# Patient Record
Sex: FEMALE | Race: WHITE
[De-identification: names, ages, dates, MRNs, and addresses within clinical notes are randomized per-mention and may not be internally consistent; named-entity substitution may affect disease eponyms.]

---

## 2017-04-27 ENCOUNTER — HOSPITAL ENCOUNTER (OUTPATIENT)
Dept: HOSPITAL 10 - U/S | Age: 72
Discharge: HOME | End: 2017-04-27
Attending: STUDENT IN AN ORGANIZED HEALTH CARE EDUCATION/TRAINING PROGRAM
Payer: MEDICARE

## 2017-04-27 DIAGNOSIS — K82.4: Primary | ICD-10-CM

## 2017-04-27 PROCEDURE — 76705 ECHO EXAM OF ABDOMEN: CPT

## 2017-04-27 NOTE — RADRPT
PROCEDURE:   Right upper quadrant abdominal ultrasound. 

 

CLINICAL INDICATION:  Mild pain, gallbladder polyp

 

TECHNIQUE:   Gray scale and color doppler ultrasound images of the right upper quadrant. 

 

COMPARISON:   None 

 

FINDINGS:

 

Pancreas:  Visualized portions appear of normal echogenicity, no focal lesions.

 

Liver: 

Morphology:  Normal in size and contour.

Echogenicity:  Normal.

Focal lesions:   A few benign-appearing cysts are present measuring up to 4.7 cm.

Main portal vein: Patent with hepatopetal flow.

 

Biliary System: 

Normal appearing gallbladder wall.

No gallstones seen. 4 x 10 mm nonshadowing echogenic mural structures noted within the gallbladder.

No intrahepatic biliary dilatation.

Common bile duct measures 2.7 mm in maximal dimension.   

 

Kidneys:

Right 10.0 cm in length. Right renal cortical thickness is preserved.

Normal echogenicity.

No hydronephrosis.

No renal calculi.

No focal lesions.

 

No free fluid identified. 

 

 

IMPRESSION:

4 x 10 mm nonshadowing echogenic mural structure within the gallbladder compatible with the history 
of gallbladder polyp.  The patient's prior examinations are not available for comparison at time of 
interpretation. If this lesion is enlarged from the prior examinations MRI of the abdomen with and w
ithout contrast is suggested for further evaluation.

 

 

 

RPTAT: AADD

_____________________________________________ 

.Antonio Armenta MD, MD           Date    Time 

Electronically viewed and signed by .Antonio Armenta MD, MD on 04/27/2017 09:18 

 

D:  04/27/2017 09:18  T:  04/27/2017 09:18

.B/

## 2017-06-16 ENCOUNTER — HOSPITAL ENCOUNTER (EMERGENCY)
Dept: HOSPITAL 10 - E/R | Age: 72
Discharge: HOME | End: 2017-06-16
Payer: MEDICARE

## 2017-06-16 VITALS
WEIGHT: 89.29 LBS | HEIGHT: 61 IN | BODY MASS INDEX: 16.86 KG/M2 | HEIGHT: 61 IN | BODY MASS INDEX: 16.86 KG/M2 | WEIGHT: 89.29 LBS

## 2017-06-16 VITALS — SYSTOLIC BLOOD PRESSURE: 135 MMHG | HEART RATE: 56 BPM | RESPIRATION RATE: 18 BRPM | DIASTOLIC BLOOD PRESSURE: 91 MMHG

## 2017-06-16 DIAGNOSIS — K21.9: ICD-10-CM

## 2017-06-16 DIAGNOSIS — R10.84: Primary | ICD-10-CM

## 2017-06-16 DIAGNOSIS — R11.2: ICD-10-CM

## 2017-06-16 LAB
ADD SCAN DIFF: NO
ADD UMIC: YES
ALBUMIN SERPL-MCNC: 5.2 G/DL (ref 3.3–4.9)
ALBUMIN/GLOB SERPL: 4 {RATIO}
ALP SERPL-CCNC: 122 IU/L (ref 42–121)
ALT SERPL-CCNC: 27 IU/L (ref 13–69)
ANION GAP SERPL CALC-SCNC: 15 MMOL/L (ref 8–16)
AST SERPL-CCNC: 31 IU/L (ref 15–46)
BASOPHILS # BLD AUTO: 0 10^3/UL (ref 0–0.1)
BASOPHILS NFR BLD: 0.5 % (ref 0–2)
BILIRUB DIRECT SERPL-MCNC: 0 MG/DL (ref 0–0.2)
BILIRUB SERPL-MCNC: 1 MG/DL (ref 0.2–1.3)
BUN SERPL-MCNC: 13 MG/DL (ref 7–20)
CALCIUM SERPL-MCNC: 9.9 MG/DL (ref 8.4–10.2)
CHLORIDE SERPL-SCNC: 101 MMOL/L (ref 97–110)
CO2 SERPL-SCNC: 26 MMOL/L (ref 21–31)
COLOR UR: (no result)
CREAT SERPL-MCNC: 0.63 MG/DL (ref 0.44–1)
EOSINOPHIL # BLD: 0 10^3/UL (ref 0–0.5)
EOSINOPHIL NFR BLD: 0.5 % (ref 0–7)
ERYTHROCYTE [DISTWIDTH] IN BLOOD BY AUTOMATED COUNT: 11.7 % (ref 11.5–14.5)
GLOBULIN SER-MCNC: 1.3 G/DL (ref 1.3–3.2)
GLUCOSE SERPL-MCNC: 106 MG/DL (ref 70–220)
GLUCOSE UR STRIP-MCNC: NEGATIVE %
HCT VFR BLD CALC: 42.5 % (ref 37–47)
HGB BLD-MCNC: 14.3 G/DL (ref 12–16)
KETONES UR STRIP.AUTO-MCNC: NEGATIVE MG/DL
LYMPHOCYTES # BLD AUTO: 1.5 10^3/UL (ref 0.8–2.9)
LYMPHOCYTES NFR BLD AUTO: 34.8 % (ref 15–51)
MCH RBC QN AUTO: 31.8 PG (ref 29–33)
MCHC RBC AUTO-ENTMCNC: 33.6 G/DL (ref 32–37)
MCV RBC AUTO: 94.7 FL (ref 82–101)
MONOCYTES # BLD: 0.2 10^3/UL (ref 0.3–0.9)
MONOCYTES NFR BLD: 5.5 % (ref 0–11)
NEUTROPHILS # BLD: 2.6 10^3/UL (ref 1.6–7.5)
NEUTROPHILS NFR BLD AUTO: 58.5 % (ref 39–77)
NITRITE UR QL STRIP.AUTO: NEGATIVE
NRBC # BLD MANUAL: 0 10^3/UL (ref 0–0)
NRBC BLD QL: 0 /100WBC (ref 0–0)
PLATELET # BLD: 211 10^3/UL (ref 140–415)
PMV BLD AUTO: 10.3 FL (ref 7.4–10.4)
POTASSIUM SERPL-SCNC: 3.7 MMOL/L (ref 3.5–5.1)
PROT SERPL-MCNC: 6.5 G/DL (ref 6.1–8.1)
RBC # BLD AUTO: 4.49 10^6/UL (ref 4.2–5.4)
RBC # UR AUTO: (no result) /UL
RBC #/AREA URNS HPF: (no result) /HPF
SODIUM SERPL-SCNC: 138 MMOL/L (ref 135–144)
UR BILIRUBIN (DIP): NEGATIVE
UR CLARITY: CLEAR
UR TOTAL PROTEIN (DIP): NEGATIVE
UROBILINOGEN UR STRIP-ACNC: (no result) (ref 0.1–1)
WBC # BLD AUTO: 4.4 10^3/UL (ref 4.8–10.8)
WBC # UR STRIP: NEGATIVE /UL

## 2017-06-16 PROCEDURE — 36415 COLL VENOUS BLD VENIPUNCTURE: CPT

## 2017-06-16 PROCEDURE — 96374 THER/PROPH/DIAG INJ IV PUSH: CPT

## 2017-06-16 PROCEDURE — 81001 URINALYSIS AUTO W/SCOPE: CPT

## 2017-06-16 PROCEDURE — 80053 COMPREHEN METABOLIC PANEL: CPT

## 2017-06-16 PROCEDURE — 85025 COMPLETE CBC W/AUTO DIFF WBC: CPT

## 2017-06-16 PROCEDURE — 83690 ASSAY OF LIPASE: CPT

## 2017-06-16 PROCEDURE — 99285 EMERGENCY DEPT VISIT HI MDM: CPT

## 2017-06-16 PROCEDURE — 74176 CT ABD & PELVIS W/O CONTRAST: CPT

## 2017-06-16 PROCEDURE — 96375 TX/PRO/DX INJ NEW DRUG ADDON: CPT

## 2017-06-16 NOTE — ERD
ER Documentation


Chief Complaint


Date/Time


DATE: 6/16/17 


TIME: 14:44


Chief Complaint


abdominal pain and headache since last night





HPI


This is a 71-year-old female who presents to the emergency room for abdominal 

cramping, generalized weakness and slight headache for the past 24 hours.  The 

patient denies any fevers associated with this.  She denies any diarrhea but 

does say she is nauseous and vomited once.  She denies any blood in her vomit 

and denies any bilious color to her vomit.  She does state that she has a 

history of reflux disease and this feels similar to reflux.  She came to the ER 

today for evaluation.





ROS


All systems reviewed and are negative except as per history of present illness.





Medications


Home Meds


Reported Medications


Amitriptyline Hcl* (Amitriptyline Hcl*) 25 Mg Tablet, 25 MG PO QHS, #30 TAB


   6/16/17


Nitroglycerin* (Nitrostat*) 0.4 Mg Tab.subl, 0.4 MG SL Q5MIN Y for CHEST PAIN, 

BOTTLE


   12/8/14


Discontinued Reported Medications


Omeprazole* (Omeprazole*) 20 Mg Capsule.dr, 20 MG PO DAILY, CAP


   12/8/14


Amitriptyline Hcl* (Elavil*) 75 Mg Tab, 75 MG PO HS


   12/20/11


Sertraline Hcl* (Zoloft*) 100 Mg Tablet, 100 MG PO HS


   12/8/11





Allergies


Allergies:  


Coded Allergies:  


     iodine (Verified  Allergy, Severe, 6/16/17)


Uncoded Allergies:  


     TRANQUILIZER (Allergy, Unknown, rash, palpitations, 4/10/14)





PMhx/Soc


History of Surgery:  Yes (FACIAL SURGERY 1986 S/P ORBITAL FX )


Anesthesia Reaction:  No


Hx Neurological Disorder:  No


Hx Respiratory Disorders:  No


Hx Cardiac Disorders:  No


Hx Psychiatric Problems:  Yes (DEPRESSION )


Hx Miscellaneous Medical Probl:  No (HYPER THYROID, INTESTINAL POLYPS, KIDNEY, 

LIVER, GALLBLADDER)


Hx Alcohol Use:  No


Hx Substance Use:  No


Hx Tobacco Use:  No


Smoking Status:  Never smoker





Physical Exam


Vitals





Vital Signs








  Date Time  Temp Pulse Resp B/P Pulse Ox O2 Delivery O2 Flow Rate FiO2


 


6/16/17 11:47 98.2 71 20 179/82 98   








Physical Exam


INITIAL VITAL SIGNS: Reviewed by me


GENERAL:  The patient is well developed and appropriate for usual state of 

health in no apparent distress


HEENT: Dry mucous murmurs, pupils equal, round, and reactive to light.  EOMI. 

There is no scleral icterus.


NECK:  C-spine is soft and supple, there is no meningismus.  There is no 

cervical lymphadenopathy.


LUNGS:  Clear to auscultation bilaterally. There are no rales, wheezes or 

rhonchi.


HEART:  Regular rate and rhythm, no murmurs, clicks, rubs or gallops.


ABDOMEN:  Soft, non-tender, non-distended.  There are bowel sounds in all four 

quadrants. No rebound or guarding.


EXTREMITIES:  There is no peripheral cyanosis or edema.  No focal swelling or 

erythema.


NEUROLOGICAL:  The patient moves all four extremities with 5/5 strength.  

Cranial nerves II - XII are intact. Normal gait. Alert and oriented


SKIN:  There is no apparent rash or petechiae.


HEME/LYMPHATIC:  There is no evidence of excessive bruising or lymphedema.


PSYCHIATRIC:  The patient does not appear anxious or depressed.


Result Diagram:  


6/16/17 1220                                                                   

             6/16/17 1220





Results 24 hrs





 Laboratory Tests








Test


  6/16/17


12:20 6/16/17


12:40


 


White Blood Count 4.410^3/ul  


 


Red Blood Count 4.4910^6/ul  


 


Hemoglobin 14.3g/dl  


 


Hematocrit 42.5%  


 


Mean Corpuscular Volume 94.7fl  


 


Mean Corpuscular Hemoglobin 31.8pg  


 


Mean Corpuscular Hemoglobin


Concent 33.6g/dl 


  


 


 


Red Cell Distribution Width 11.7%  


 


Platelet Count 17322^3/UL  


 


Mean Platelet Volume 10.3fl  


 


Neutrophils % 58.5%  


 


Lymphocytes % 34.8%  


 


Monocytes % 5.5%  


 


Eosinophils % 0.5%  


 


Basophils % 0.5%  


 


Nucleated Red Blood Cells % 0.0/100WBC  


 


Neutrophils # 2.610^3/ul  


 


Lymphocytes # 1.510^3/ul  


 


Monocytes # 0.210^3/ul  


 


Eosinophils # 0.010^3/ul  


 


Basophils # 0.010^3/ul  


 


Nucleated Red Blood Cells # 0.010^3/ul  


 


Sodium Level 138mmol/L  


 


Potassium Level 3.7mmol/L  


 


Chloride Level 101mmol/L  


 


Carbon Dioxide Level 26mmol/L  


 


Anion Gap 15  


 


Blood Urea Nitrogen 13mg/dl  


 


Creatinine 0.63mg/dl  


 


Glucose Level 106mg/dl  


 


Calcium Level 9.9mg/dl  


 


Total Bilirubin 1.0mg/dl  


 


Direct Bilirubin 0.00mg/dl  


 


Indirect Bilirubin 1.0mg/dl  


 


Aspartate Amino Transf


(AST/SGOT) 31IU/L 


  


 


 


Alanine Aminotransferase


(ALT/SGPT) 27IU/L 


  


 


 


Alkaline Phosphatase 122IU/L  


 


Total Protein 6.5g/dl  


 


Albumin 5.2g/dl  


 


Globulin 1.30g/dl  


 


Albumin/Globulin Ratio 4.00  


 


Lipase 45U/L  


 


Urine Color  LT. YELLOW 


 


Urine Clarity  CLEAR 


 


Urine pH  5.5 


 


Urine Specific Gravity  <=1.005 


 


Urine Ketones  NEGATIVE 


 


Urine Nitrite  NEGATIVE 


 


Urine Bilirubin  NEGATIVE 


 


Urine Urobilinogen  0.2  E.U./dL 


 


Urine Leukocyte Esterase  NEGATIVE 


 


Urine Microscopic RBC  NONE SEEN/HPF 


 


Urine Microscopic WBC  NONE SEEN/HPF 


 


Urine Epithelial Cells  RARE 


 


Urine Hemoglobin  2+ 


 


Urine Glucose  NEGATIVE% 


 


Urine Total Protein  NEGATIVE 








 Current Medications








 Medications


  (Trade)  Dose


 Ordered  Sig/Paola


 Route


 PRN Reason  Start Time


 Stop Time Status Last Admin


Dose Admin


 


 Sodium Chloride


  (NS)  1,000 ml @ 


 1,000 mls/hr  Q1H STAT


 IV


   6/16/17 12:22


 6/16/17 13:21 DC 6/16/17 12:47


 


 


 Ondansetron HCl


  (Zofran Inj)  4 mg  ONCE  STAT


 IV


   6/16/17 12:22


 6/16/17 12:23 DC 6/16/17 12:47


 


 


 Famotidine


  (Pepcid Iv)  20 mg  ONCE  STAT


 IV


   6/16/17 12:22


 6/16/17 12:23 DC 6/16/17 12:47


 











Procedures/MDM


CT abdomen pelvis without: 1.  Benign hepatic cysts.


2.  Calcification in the wall of the gallbladder.


3.  Benign cyst superiorly in the left kidney.


4.  Partially calcified mass in the right kidney superiorly measuring 1.0 cm.  

Follow-up CT scan and 6 months advised.


5.  Atherosclerosis.


6.  Calcified fibroids in the uterus.


7.  Degenerative changes of the spine.


8.  Bilateral pars defects at L5 and grade 1 anterolisthesis at L5-S1.


 





This 71-year-old female presents to the emergency room for evaluation of 

generalized weakness abdominal cramping, nausea.  She does have history of 

reflux and states that this does feel similar to previous reflux.  When I 

evaluated this patient she was in no acute distress.  She was afebrile and 

hemodynamically stable.  She had no signs of abdominal pain on my examination, 

no signs of an acute abdomen or surgical abdomen.  I did note mild dry mucous 

membranes.  The patient was given Zofran Pepcid, 1 L fluids.  CT the abdomen 

pelvis was obtained and does not show any acute abnormality requiring immediate 

intervention.  This patient is scheduled for a colonoscopy for colonic polyps 

and then next week and I advised her to maintain her appointment.  This patient 

will be discharged at this time with a prescription for Zantac and instructions 

to return to the ER if she develops any worsening symptoms.  Differential 

diagnoses entertained was broad with potential high acuity.  Patient has been 

evaluated for appendicitis, cholecystitis, and other high risk medical and 

surgical causes of abdominal pain.  Ultimately the patient's evaluation is 

nondiagnostic.  Based on the patient's lack of risk factors, as well as the 

patient's clinical, laboratory, and imaging data, the patient appears to be low 

risk for these high risk causes of abdominal pain.





Departure


Diagnosis:  


 Primary Impression:  


 Abdominal pain


 Additional Impression:  


 GERD (gastroesophageal reflux disease)











MAINE DORMAN DO Jun 16, 2017 14:46

## 2017-06-16 NOTE — RADRPT
PROCEDURE:   CT Abdomen and Pelvis without contrast. 

 

CLINICAL INDICATION:   Abdominal and pelvic pain.  

 

TECHNIQUE:   CT scan of the abdomen and pelvis without contrast was performed. Coronal and sagittal 
reformatted images were obtained from the axial source images. Images were reviewed on a high-resolu
tion PACS workstation. Total exam DLP is 195.10 mGy-cm.  CTDIvol is 3.92 mGy.  One or more of the fo
llowing dose reduction techniques were used: Automated exposure control, adjustment of the mA and/or
 kV according to patient size, use of iterative reconstruction technique.

 

COMPARISON:   None. 

 

FINDINGS:

The lung bases are normal.  There is no pleural effusion. 

The liver is normal in size and attenuation. There is a benign cyst posteriorly in the posterior seg
ment of the right hepatic lobe inferiorly measuring 2.3 x 2.3 cm and a benign cyst is also present p
osteriorly in the posterior segment of the right hepatic lobe medially measuring 4.2 x 4.4 cm.  Ther
e is no other focal hepatic lesion.  

There is faint calcification in the wall of the gallbladder.  The gallbladder and bile ducts are oth
erwise unremarkable. 

The spleen is normal in size.  There is no focal splenic lesion. 

Both adrenals are normal with no enlargement or mass. 

The pancreas is unremarkable with no mass or evidence of pancreatitis. 

There is a benign cyst superiorly in the left kidney measuring 2.8 x 6.1 cm in AP and transverse dim
ensions.  A partially calcified mass is present superiorly in the right kidney measuring 1.0 x 1.0 c
m.  There is no renal calculus or hydronephrosis. 

The abdominal aorta is not dilated.  There is calcification in the aorta consistent with atheroscler
osis. 

There is no retroperitoneal lymphadenopathy or mass. 

There is no pelvic lymphadenopathy.  Multiple calcified fibroids are present in the uterus with the 
largest measuring 4.7 cm. 

The bladder and distal ureters are normal. 

The periappendiceal region is unremarkable with no evidence of appendicitis. 

The bowel and mesentery are normal. 

There is no free fluid or free gas. 

There are degenerative changes of the spine.  There is no acute fracture or lytic lesion.  Bilateral
 pars defects are present at L5.  There is grade 1 anterolisthesis at L5-S1.  

 

IMPRESSION:

1.  Benign hepatic cysts.

2.  Calcification in the wall of the gallbladder.

3.  Benign cyst superiorly in the left kidney.

4.  Partially calcified mass in the right kidney superiorly measuring 1.0 cm.  Follow-up CT scan and
 6 months advised.

5.  Atherosclerosis.

6.  Calcified fibroids in the uterus.

7.  Degenerative changes of the spine.

8.  Bilateral pars defects at L5 and grade 1 anterolisthesis at L5-S1.

 

RPTAT: QQ

_____________________________________________ 

.Celso Alvarez MD, MD           Date    Time 

Electronically viewed and signed by .Celso Alvarez MD, MD on 06/16/2017 14:33 

 

D:  06/16/2017 14:33  T:  06/16/2017 14:33

.R/

## 2017-07-24 ENCOUNTER — HOSPITAL ENCOUNTER (OUTPATIENT)
Dept: HOSPITAL 10 - EKG | Age: 72
Discharge: HOME | End: 2017-07-24
Attending: SURGERY
Payer: MEDICARE

## 2017-07-24 DIAGNOSIS — K76.89: Primary | ICD-10-CM

## 2017-07-24 LAB
ALBUMIN SERPL-MCNC: 4.3 G/DL (ref 3.3–4.9)
ALBUMIN/GLOB SERPL: 1.26 {RATIO}
ALP SERPL-CCNC: 112 IU/L (ref 42–121)
ALT SERPL-CCNC: 29 IU/L (ref 13–69)
ANION GAP SERPL CALC-SCNC: 18 MMOL/L (ref 8–16)
AST SERPL-CCNC: 23 IU/L (ref 15–46)
BASOPHILS # BLD AUTO: 0 10^3/UL (ref 0–0.1)
BASOPHILS NFR BLD: 0.5 % (ref 0–2)
BILIRUB DIRECT SERPL-MCNC: 0 MG/DL (ref 0–0.2)
BILIRUB SERPL-MCNC: 0.8 MG/DL (ref 0.2–1.3)
BUN SERPL-MCNC: 12 MG/DL (ref 7–20)
CALCIUM SERPL-MCNC: 9.1 MG/DL (ref 8.4–10.2)
CHLORIDE SERPL-SCNC: 103 MMOL/L (ref 97–110)
CO2 SERPL-SCNC: 27 MMOL/L (ref 21–31)
CREAT SERPL-MCNC: 0.71 MG/DL (ref 0.44–1)
EOSINOPHIL # BLD: 0 10^3/UL (ref 0–0.5)
EOSINOPHIL NFR BLD: 0.5 % (ref 0–7)
ERYTHROCYTE [DISTWIDTH] IN BLOOD BY AUTOMATED COUNT: 12.3 % (ref 11.5–14.5)
GLOBULIN SER-MCNC: 3.4 G/DL (ref 1.3–3.2)
GLUCOSE SERPL-MCNC: 99 MG/DL (ref 70–220)
HCT VFR BLD CALC: 38 % (ref 37–47)
HGB BLD-MCNC: 12.7 G/DL (ref 12–16)
LYMPHOCYTES # BLD AUTO: 1.5 10^3/UL (ref 0.8–2.9)
LYMPHOCYTES NFR BLD AUTO: 38.6 % (ref 15–51)
MCH RBC QN AUTO: 33.2 PG (ref 29–33)
MCHC RBC AUTO-ENTMCNC: 33.4 G/DL (ref 32–37)
MCV RBC AUTO: 99.2 FL (ref 82–101)
MONOCYTES # BLD: 0.3 10^3/UL (ref 0.3–0.9)
MONOCYTES NFR BLD: 7 % (ref 0–11)
NEUTROPHILS # BLD: 2.1 10^3/UL (ref 1.6–7.5)
NEUTROPHILS NFR BLD AUTO: 53.1 % (ref 39–77)
NRBC # BLD MANUAL: 0 10^3/UL (ref 0–0)
NRBC BLD AUTO-RTO: 0 /100WBC (ref 0–0)
PLATELET # BLD: 197 10^3/UL (ref 140–415)
PMV BLD AUTO: 10.7 FL (ref 7.4–10.4)
POTASSIUM SERPL-SCNC: 4 MMOL/L (ref 3.5–5.1)
PROT SERPL-MCNC: 7.7 G/DL (ref 6.1–8.1)
RBC # BLD AUTO: 3.83 10^6/UL (ref 4.2–5.4)
SODIUM SERPL-SCNC: 144 MMOL/L (ref 135–144)
WBC # BLD AUTO: 4 10^3/UL (ref 4.8–10.8)

## 2017-07-24 PROCEDURE — 93005 ELECTROCARDIOGRAM TRACING: CPT

## 2017-07-24 PROCEDURE — 85025 COMPLETE CBC W/AUTO DIFF WBC: CPT

## 2017-07-24 PROCEDURE — 71020: CPT

## 2017-07-24 PROCEDURE — 80053 COMPREHEN METABOLIC PANEL: CPT

## 2017-07-24 NOTE — RADRPT
PROCEDURE:   XR Chest. 

 

CLINICAL INDICATION:   PRE OP/ CHOLECYSTECTOMY 

 

TECHNIQUE:   PA and Lateral views of the chest were obtained. 

 

COMPARISON:   Chest x-ray 12/08/2014 

 

FINDINGS:

The cardiomediastinal silhouette is within normal limits. 

Biapical pleural parenchymal scarring is noted.

No pneumothorax, pleural effusion, or parenchymal consolidation is identified.

No evidence of pulmonary vascular congestion.

The osseous structures, as visualized, are unremarkable.

 

IMPRESSION:

No evidence for active cardiopulmonary disease. 

 

RPTAT: PP

_____________________________________________ 

Physician Toya           Date    Time 

Electronically viewed and signed by Physician Toya on 07/24/2017 11:41 

 

D:  07/24/2017 11:41  T:  07/24/2017 11:41

RC/

## 2017-07-26 NOTE — RADRPT
Vent Rate: 62 bpm

RR Interval: 0 msec

PA Interval: 158 msec

QRS Duration: 86 msec

QT Interval: 448 msec

QTC Interval: 454 msec

P-R-T Axis: 61 - 67 - 63 degrees

 

 Normal sinus rhythm

 Minimal voltage criteria for LVH, may be normal variant

 Borderline ECG

 

Electronically Signed By: Morris Gonzales

[id number]

## 2018-11-05 ENCOUNTER — HOSPITAL ENCOUNTER (INPATIENT)
Dept: HOSPITAL 91 - 6WM | Age: 73
LOS: 6 days | Discharge: HOME | DRG: 392 | End: 2018-11-11
Payer: MEDICARE

## 2018-11-05 DIAGNOSIS — K59.00: ICD-10-CM

## 2018-11-05 DIAGNOSIS — E44.0: ICD-10-CM

## 2018-11-05 DIAGNOSIS — R11.2: ICD-10-CM

## 2018-11-05 DIAGNOSIS — R42: ICD-10-CM

## 2018-11-05 DIAGNOSIS — F32.9: ICD-10-CM

## 2018-11-05 DIAGNOSIS — R07.9: ICD-10-CM

## 2018-11-05 DIAGNOSIS — K21.9: Primary | ICD-10-CM

## 2018-11-05 DIAGNOSIS — R07.89: ICD-10-CM

## 2018-11-05 DIAGNOSIS — D72.819: ICD-10-CM

## 2018-11-05 LAB
ADD MAN DIFF?: NO
ADD UMIC: YES
ALANINE AMINOTRANSFERASE: 26 IU/L (ref 13–69)
ALBUMIN/GLOBULIN RATIO: 1.7
ALBUMIN: 4.6 G/DL (ref 3.3–4.9)
ALKALINE PHOSPHATASE: 128 IU/L (ref 42–121)
ANION GAP: 17 (ref 5–13)
ASPARTATE AMINO TRANSFERASE: 31 IU/L (ref 15–46)
BASOPHIL #: 0 10^3/UL (ref 0–0.1)
BASOPHILS %: 0.5 % (ref 0–2)
BILIRUBIN,DIRECT: 0 MG/DL (ref 0–0.2)
BILIRUBIN,TOTAL: 1.3 MG/DL (ref 0.2–1.3)
BLOOD UREA NITROGEN: 15 MG/DL (ref 7–20)
CALCIUM: 9.1 MG/DL (ref 8.4–10.2)
CARBON DIOXIDE: 22 MMOL/L (ref 21–31)
CHLORIDE: 96 MMOL/L (ref 97–110)
CREATININE: 0.54 MG/DL (ref 0.44–1)
EOSINOPHILS #: 0 10^3/UL (ref 0–0.5)
EOSINOPHILS %: 0.3 % (ref 0–7)
GLOBULIN: 2.7 G/DL (ref 1.3–3.2)
GLUCOSE: 129 MG/DL (ref 70–220)
HEMATOCRIT: 38.6 % (ref 37–47)
HEMOGLOBIN: 13 G/DL (ref 12–16)
IMMATURE GRANS #M: 0.02 10^3/UL (ref 0–0.03)
IMMATURE GRANS % (M): 0.5 % (ref 0–0.43)
INR: 0.94
LIPASE: 40 U/L (ref 23–300)
LYMPHOCYTES #: 0.9 10^3/UL (ref 0.8–2.9)
LYMPHOCYTES %: 23.6 % (ref 15–51)
MEAN CORPUSCULAR HEMOGLOBIN: 32.7 PG (ref 29–33)
MEAN CORPUSCULAR HGB CONC: 33.7 G/DL (ref 32–37)
MEAN CORPUSCULAR VOLUME: 97 FL (ref 82–101)
MEAN PLATELET VOLUME: 10.1 FL (ref 7.4–10.4)
MONOCYTE #: 0.2 10^3/UL (ref 0.3–0.9)
MONOCYTES %: 6.2 % (ref 0–11)
NEUTROPHIL #: 2.5 10^3/UL (ref 1.6–7.5)
NEUTROPHILS %: 68.9 % (ref 39–77)
NUCLEATED RED BLOOD CELLS #: 0 10^3/UL (ref 0–0)
NUCLEATED RED BLOOD CELLS%: 0 /100WBC (ref 0–0)
PARTIAL THROMBOPLASTIN TIME: 30 SEC (ref 23–35)
PLATELET COUNT: 188 10^3/UL (ref 140–415)
POTASSIUM: 3.6 MMOL/L (ref 3.5–5.1)
PROTIME: 12.7 SEC (ref 11.9–14.9)
PT RATIO: 1
RED BLOOD COUNT: 3.98 10^6/UL (ref 4.2–5.4)
RED CELL DISTRIBUTION WIDTH: 11.7 % (ref 11.5–14.5)
SODIUM: 135 MMOL/L (ref 135–144)
TOTAL PROTEIN: 7.3 G/DL (ref 6.1–8.1)
TROPONIN-I: < 0.012 NG/ML (ref 0–0.12)
UR ASCORBIC ACID: NEGATIVE MG/DL
UR BILIRUBIN (DIP): NEGATIVE MG/DL
UR BLOOD (DIP): (no result) MG/DL
UR CLARITY: (no result)
UR COLOR: YELLOW
UR GLUCOSE (DIP): (no result) MG/DL
UR KETONES (DIP): (no result) MG/DL
UR LEUKOCYTE ESTERASE (DIP): NEGATIVE LEU/UL
UR NITRITE (DIP): NEGATIVE MG/DL
UR PH (DIP): 8 (ref 5–9)
UR RBC: 7 /HPF (ref 0–5)
UR SPECIFIC GRAVITY (DIP): 1.01 (ref 1–1.03)
UR SQUAMOUS EPITHELIAL CELL: (no result) /HPF
UR TOTAL PROTEIN (DIP): NEGATIVE MG/DL
UR UROBILINOGEN (DIP): NEGATIVE MG/DL
UR WBC: 1 /HPF (ref 0–5)
WHITE BLOOD COUNT: 3.7 10^3/UL (ref 4.8–10.8)

## 2018-11-05 PROCEDURE — 84244 ASSAY OF RENIN: CPT

## 2018-11-05 PROCEDURE — 93976 VASCULAR STUDY: CPT

## 2018-11-05 PROCEDURE — 97116 GAIT TRAINING THERAPY: CPT

## 2018-11-05 PROCEDURE — 87338 HPYLORI STOOL AG IA: CPT

## 2018-11-05 PROCEDURE — 82088 ASSAY OF ALDOSTERONE: CPT

## 2018-11-05 PROCEDURE — 83036 HEMOGLOBIN GLYCOSYLATED A1C: CPT

## 2018-11-05 PROCEDURE — 80053 COMPREHEN METABOLIC PANEL: CPT

## 2018-11-05 PROCEDURE — 36415 COLL VENOUS BLD VENIPUNCTURE: CPT

## 2018-11-05 PROCEDURE — 97162 PT EVAL MOD COMPLEX 30 MIN: CPT

## 2018-11-05 PROCEDURE — 80061 LIPID PANEL: CPT

## 2018-11-05 PROCEDURE — 71045 X-RAY EXAM CHEST 1 VIEW: CPT

## 2018-11-05 PROCEDURE — 87075 CULTR BACTERIA EXCEPT BLOOD: CPT

## 2018-11-05 PROCEDURE — 85025 COMPLETE CBC W/AUTO DIFF WBC: CPT

## 2018-11-05 PROCEDURE — 97530 THERAPEUTIC ACTIVITIES: CPT

## 2018-11-05 PROCEDURE — 96374 THER/PROPH/DIAG INJ IV PUSH: CPT

## 2018-11-05 PROCEDURE — 75635 CT ANGIO ABDOMINAL ARTERIES: CPT

## 2018-11-05 PROCEDURE — 97110 THERAPEUTIC EXERCISES: CPT

## 2018-11-05 PROCEDURE — 74176 CT ABD & PELVIS W/O CONTRAST: CPT

## 2018-11-05 PROCEDURE — 85730 THROMBOPLASTIN TIME PARTIAL: CPT

## 2018-11-05 PROCEDURE — 82550 ASSAY OF CK (CPK): CPT

## 2018-11-05 PROCEDURE — 84484 ASSAY OF TROPONIN QUANT: CPT

## 2018-11-05 PROCEDURE — 93306 TTE W/DOPPLER COMPLETE: CPT

## 2018-11-05 PROCEDURE — 81001 URINALYSIS AUTO W/SCOPE: CPT

## 2018-11-05 PROCEDURE — 85610 PROTHROMBIN TIME: CPT

## 2018-11-05 PROCEDURE — 84100 ASSAY OF PHOSPHORUS: CPT

## 2018-11-05 PROCEDURE — 80048 BASIC METABOLIC PNL TOTAL CA: CPT

## 2018-11-05 PROCEDURE — 83735 ASSAY OF MAGNESIUM: CPT

## 2018-11-05 PROCEDURE — 93005 ELECTROCARDIOGRAM TRACING: CPT

## 2018-11-05 PROCEDURE — 83690 ASSAY OF LIPASE: CPT

## 2018-11-05 PROCEDURE — 83605 ASSAY OF LACTIC ACID: CPT

## 2018-11-05 PROCEDURE — 84443 ASSAY THYROID STIM HORMONE: CPT

## 2018-11-05 PROCEDURE — 99285 EMERGENCY DEPT VISIT HI MDM: CPT

## 2018-11-05 PROCEDURE — 82533 TOTAL CORTISOL: CPT

## 2018-11-05 PROCEDURE — 82553 CREATINE MB FRACTION: CPT

## 2018-11-05 RX ADMIN — THIAMINE HYDROCHLORIDE 1 MLS/HR: 100 INJECTION, SOLUTION INTRAMUSCULAR; INTRAVENOUS at 21:01

## 2018-11-05 RX ADMIN — ONDANSETRON HYDROCHLORIDE 1 MG: 2 INJECTION, SOLUTION INTRAMUSCULAR; INTRAVENOUS at 21:09

## 2018-11-05 RX ADMIN — ASPIRIN 81 MG CHEWABLE TABLET 1 MG: 81 TABLET CHEWABLE at 21:52

## 2018-11-06 LAB
ADD MAN DIFF?: NO
ANION GAP: 13 (ref 5–13)
BASOPHIL #: 0 10^3/UL (ref 0–0.1)
BASOPHILS %: 0.7 % (ref 0–2)
BLOOD UREA NITROGEN: 11 MG/DL (ref 7–20)
CALCIUM: 8.8 MG/DL (ref 8.4–10.2)
CARBON DIOXIDE: 23 MMOL/L (ref 21–31)
CHLORIDE: 103 MMOL/L (ref 97–110)
CK INDEX: 1.4
CK INDEX: 1.4
CK-MB: 1.2 NG/ML (ref 0–2.4)
CK-MB: 1.27 NG/ML (ref 0–2.4)
CREATINE KINASE: 87 IU/L (ref 23–200)
CREATINE KINASE: 90 IU/L (ref 23–200)
CREATININE: 0.59 MG/DL (ref 0.44–1)
EOSINOPHILS #: 0 10^3/UL (ref 0–0.5)
EOSINOPHILS %: 0.5 % (ref 0–7)
GLUCOSE: 87 MG/DL (ref 70–220)
HEMATOCRIT: 36.1 % (ref 37–47)
HEMOGLOBIN: 12.2 G/DL (ref 12–16)
IMMATURE GRANS #M: 0.01 10^3/UL (ref 0–0.03)
IMMATURE GRANS % (M): 0.2 % (ref 0–0.43)
LYMPHOCYTES #: 1.7 10^3/UL (ref 0.8–2.9)
LYMPHOCYTES %: 41.4 % (ref 15–51)
MEAN CORPUSCULAR HEMOGLOBIN: 32.6 PG (ref 29–33)
MEAN CORPUSCULAR HGB CONC: 33.8 G/DL (ref 32–37)
MEAN CORPUSCULAR VOLUME: 96.5 FL (ref 82–101)
MEAN PLATELET VOLUME: 10.3 FL (ref 7.4–10.4)
MONOCYTE #: 0.3 10^3/UL (ref 0.3–0.9)
MONOCYTES %: 8.4 % (ref 0–11)
NEUTROPHIL #: 2 10^3/UL (ref 1.6–7.5)
NEUTROPHILS %: 48.8 % (ref 39–77)
NUCLEATED RED BLOOD CELLS #: 0 10^3/UL (ref 0–0)
NUCLEATED RED BLOOD CELLS%: 0 /100WBC (ref 0–0)
PLATELET COUNT: 185 10^3/UL (ref 140–415)
POTASSIUM: 3.6 MMOL/L (ref 3.5–5.1)
RED BLOOD COUNT: 3.74 10^6/UL (ref 4.2–5.4)
RED CELL DISTRIBUTION WIDTH: 11.6 % (ref 11.5–14.5)
SODIUM: 139 MMOL/L (ref 135–144)
THYROID STIMULATING HORMONE: 1.08 MIU/L (ref 0.47–4.68)
TROPONIN-I: < 0.012 NG/ML (ref 0–0.12)
TROPONIN-I: < 0.012 NG/ML (ref 0–0.12)
WHITE BLOOD COUNT: 4.1 10^3/UL (ref 4.8–10.8)

## 2018-11-06 RX ADMIN — RANITIDINE 1 MG: 150 TABLET ORAL at 21:38

## 2018-11-06 RX ADMIN — HEPARIN SODIUM 1 UNIT: 5000 INJECTION, SOLUTION INTRAVENOUS; SUBCUTANEOUS at 21:49

## 2018-11-06 RX ADMIN — HEPARIN SODIUM 1 UNIT: 5000 INJECTION, SOLUTION INTRAVENOUS; SUBCUTANEOUS at 09:00

## 2018-11-06 RX ADMIN — ALUMINUM HYDROXIDE, MAGNESIUM HYDROXIDE, AND SIMETHICONE 1 ML: 200; 200; 20 SUSPENSION ORAL at 13:25

## 2018-11-06 RX ADMIN — RANITIDINE 1 MG: 150 TABLET ORAL at 08:56

## 2018-11-07 ENCOUNTER — HOSPITAL ENCOUNTER (INPATIENT)
Age: 73
LOS: 4 days | Discharge: HOME | DRG: 392 | End: 2018-11-11

## 2018-11-07 LAB
ADD MAN DIFF?: NO
ALANINE AMINOTRANSFERASE: 24 IU/L (ref 13–69)
ALBUMIN/GLOBULIN RATIO: 1.44
ALBUMIN: 3.9 G/DL (ref 3.3–4.9)
ALKALINE PHOSPHATASE: 115 IU/L (ref 42–121)
ANION GAP: 10 (ref 5–13)
ASPARTATE AMINO TRANSFERASE: 26 IU/L (ref 15–46)
BASOPHIL #: 0 10^3/UL (ref 0–0.1)
BASOPHILS %: 0.6 % (ref 0–2)
BILIRUBIN,DIRECT: 0 MG/DL (ref 0–0.2)
BILIRUBIN,TOTAL: 1.2 MG/DL (ref 0.2–1.3)
BLOOD UREA NITROGEN: 15 MG/DL (ref 7–20)
CALCIUM: 8.8 MG/DL (ref 8.4–10.2)
CARBON DIOXIDE: 25 MMOL/L (ref 21–31)
CHLORIDE: 105 MMOL/L (ref 97–110)
CREATININE: 0.68 MG/DL (ref 0.44–1)
EOSINOPHILS #: 0 10^3/UL (ref 0–0.5)
EOSINOPHILS %: 0.6 % (ref 0–7)
GLOBULIN: 2.7 G/DL (ref 1.3–3.2)
GLUCOSE: 96 MG/DL (ref 70–220)
HEMATOCRIT: 37.4 % (ref 37–47)
HEMOGLOBIN A1C: 5.6 % (ref 0–5.9)
HEMOGLOBIN: 12.5 G/DL (ref 12–16)
IMMATURE GRANS #M: 0.01 10^3/UL (ref 0–0.03)
IMMATURE GRANS % (M): 0.3 % (ref 0–0.43)
INR: 0.92
LACTIC ACID: 1.1 MMOL/L (ref 0.5–2)
LIPASE: 75 U/L (ref 23–300)
LYMPHOCYTES #: 1.3 10^3/UL (ref 0.8–2.9)
LYMPHOCYTES %: 38.6 % (ref 15–51)
MAGNESIUM: 2.1 MG/DL (ref 1.7–2.5)
MEAN CORPUSCULAR HEMOGLOBIN: 32.9 PG (ref 29–33)
MEAN CORPUSCULAR HGB CONC: 33.4 G/DL (ref 32–37)
MEAN CORPUSCULAR VOLUME: 98.4 FL (ref 82–101)
MEAN PLATELET VOLUME: 10.3 FL (ref 7.4–10.4)
MONOCYTE #: 0.3 10^3/UL (ref 0.3–0.9)
MONOCYTES %: 10.5 % (ref 0–11)
NEUTROPHIL #: 1.6 10^3/UL (ref 1.6–7.5)
NEUTROPHILS %: 49.4 % (ref 39–77)
NUCLEATED RED BLOOD CELLS #: 0 10^3/UL (ref 0–0)
NUCLEATED RED BLOOD CELLS%: 0 /100WBC (ref 0–0)
PLATELET COUNT: 188 10^3/UL (ref 140–415)
POTASSIUM: 3.7 MMOL/L (ref 3.5–5.1)
PROTIME: 12.4 SEC (ref 11.9–14.9)
PT RATIO: 1
RED BLOOD COUNT: 3.8 10^6/UL (ref 4.2–5.4)
RED CELL DISTRIBUTION WIDTH: 11.7 % (ref 11.5–14.5)
SODIUM: 140 MMOL/L (ref 135–144)
TOTAL PROTEIN: 6.6 G/DL (ref 6.1–8.1)
TROPONIN-I: < 0.012 NG/ML (ref 0–0.12)
WHITE BLOOD COUNT: 3.2 10^3/UL (ref 4.8–10.8)

## 2018-11-07 RX ADMIN — PANTOPRAZOLE SODIUM 1 MG: 40 TABLET, DELAYED RELEASE ORAL at 18:00

## 2018-11-07 RX ADMIN — RANITIDINE 1 MG: 150 TABLET ORAL at 20:00

## 2018-11-07 RX ADMIN — AMITRIPTYLINE HYDROCHLORIDE 1 MG: 25 TABLET, FILM COATED ORAL at 20:00

## 2018-11-07 RX ADMIN — LISINOPRIL 1 MG: 10 TABLET ORAL at 14:16

## 2018-11-07 RX ADMIN — AMITRIPTYLINE HYDROCHLORIDE 1 MG: 25 TABLET, FILM COATED ORAL at 00:42

## 2018-11-07 RX ADMIN — HEPARIN SODIUM 1 UNIT: 5000 INJECTION, SOLUTION INTRAVENOUS; SUBCUTANEOUS at 09:38

## 2018-11-07 RX ADMIN — RANITIDINE 1 MG: 150 TABLET ORAL at 09:21

## 2018-11-07 RX ADMIN — HEPARIN SODIUM 1 UNIT: 5000 INJECTION, SOLUTION INTRAVENOUS; SUBCUTANEOUS at 21:20

## 2018-11-07 RX ADMIN — ALUMINUM HYDROXIDE, MAGNESIUM HYDROXIDE, AND SIMETHICONE 1 ML: 200; 200; 20 SUSPENSION ORAL at 18:09

## 2018-11-08 LAB
ADD MAN DIFF?: NO
ADD MAN DIFF?: NO
ALANINE AMINOTRANSFERASE: 25 IU/L (ref 13–69)
ALBUMIN/GLOBULIN RATIO: 1.4
ALBUMIN: 3.5 G/DL (ref 3.3–4.9)
ALKALINE PHOSPHATASE: 93 IU/L (ref 42–121)
ANION GAP: 10 (ref 5–13)
ASPARTATE AMINO TRANSFERASE: 21 IU/L (ref 15–46)
BASOPHIL #: 0 10^3/UL (ref 0–0.1)
BASOPHILS %: 0.6 % (ref 0–2)
BASOPHILS %: 0.7 % (ref 0–2)
BASOPHILS %: 0.7 % (ref 0–2)
BILIRUBIN,DIRECT: 0 MG/DL (ref 0–0.2)
BILIRUBIN,TOTAL: 1 MG/DL (ref 0.2–1.3)
BLOOD UREA NITROGEN: 19 MG/DL (ref 7–20)
CALCIUM: 9 MG/DL (ref 8.4–10.2)
CARBON DIOXIDE: 26 MMOL/L (ref 21–31)
CHLORIDE: 104 MMOL/L (ref 97–110)
CHOL/HDL RATIO: 3.7 RATIO
CHOLESTEROL: 163 MG/DL (ref 100–200)
CREATININE: 0.76 MG/DL (ref 0.44–1)
EOSINOPHILS #: 0 10^3/UL (ref 0–0.5)
EOSINOPHILS %: 0.6 % (ref 0–7)
EOSINOPHILS %: 0.7 % (ref 0–7)
EOSINOPHILS %: 0.7 % (ref 0–7)
GLOBULIN: 2.5 G/DL (ref 1.3–3.2)
GLUCOSE: 97 MG/DL (ref 70–220)
HDL CHOLESTEROL: 44 MG/DL (ref 33–92)
HEMATOCRIT: 33.9 % (ref 37–47)
HEMATOCRIT: 34 % (ref 37–47)
HEMATOCRIT: 34.5 % (ref 37–47)
HEMOGLOBIN: 11.5 G/DL (ref 12–16)
HEMOGLOBIN: 11.6 G/DL (ref 12–16)
HEMOGLOBIN: 11.6 G/DL (ref 12–16)
IMMATURE GRANS #M: 0 10^3/UL (ref 0–0.03)
IMMATURE GRANS #M: 0.01 10^3/UL (ref 0–0.03)
IMMATURE GRANS #M: 0.01 10^3/UL (ref 0–0.03)
IMMATURE GRANS % (M): 0 % (ref 0–0.43)
IMMATURE GRANS % (M): 0.3 % (ref 0–0.43)
IMMATURE GRANS % (M): 0.3 % (ref 0–0.43)
LDL CHOLESTEROL,CALCULATED: 104 MG/DL
LYMPHOCYTES #: 1.3 10^3/UL (ref 0.8–2.9)
LYMPHOCYTES %: 42.3 % (ref 15–51)
LYMPHOCYTES %: 43.4 % (ref 15–51)
LYMPHOCYTES %: 44.4 % (ref 15–51)
MEAN CORPUSCULAR HEMOGLOBIN: 32.9 PG (ref 29–33)
MEAN CORPUSCULAR HEMOGLOBIN: 33.1 PG (ref 29–33)
MEAN CORPUSCULAR HEMOGLOBIN: 33.3 PG (ref 29–33)
MEAN CORPUSCULAR HGB CONC: 33.6 G/DL (ref 32–37)
MEAN CORPUSCULAR HGB CONC: 33.9 G/DL (ref 32–37)
MEAN CORPUSCULAR HGB CONC: 34.1 G/DL (ref 32–37)
MEAN CORPUSCULAR VOLUME: 97.7 FL (ref 82–101)
MEAN PLATELET VOLUME: 10.2 FL (ref 7.4–10.4)
MEAN PLATELET VOLUME: 10.3 FL (ref 7.4–10.4)
MEAN PLATELET VOLUME: 10.3 FL (ref 7.4–10.4)
MONOCYTE #: 0.3 10^3/UL (ref 0.3–0.9)
MONOCYTES %: 10.5 % (ref 0–11)
MONOCYTES %: 8.7 % (ref 0–11)
MONOCYTES %: 9.5 % (ref 0–11)
NEUTROPHIL #: 1.3 10^3/UL (ref 1.6–7.5)
NEUTROPHIL #: 1.3 10^3/UL (ref 1.6–7.5)
NEUTROPHIL #: 1.5 10^3/UL (ref 1.6–7.5)
NEUTROPHILS %: 44.4 % (ref 39–77)
NEUTROPHILS %: 44.7 % (ref 39–77)
NEUTROPHILS %: 47.5 % (ref 39–77)
NUCLEATED RED BLOOD CELLS #: 0 10^3/UL (ref 0–0)
NUCLEATED RED BLOOD CELLS%: 0 /100WBC (ref 0–0)
PLATELET COUNT: 171 10^3/UL (ref 140–415)
PLATELET COUNT: 177 10^3/UL (ref 140–415)
PLATELET COUNT: 190 10^3/UL (ref 140–415)
POTASSIUM: 3.9 MMOL/L (ref 3.5–5.1)
RED BLOOD COUNT: 3.47 10^6/UL (ref 4.2–5.4)
RED BLOOD COUNT: 3.48 10^6/UL (ref 4.2–5.4)
RED BLOOD COUNT: 3.53 10^6/UL (ref 4.2–5.4)
RED CELL DISTRIBUTION WIDTH: 11.5 % (ref 11.5–14.5)
RED CELL DISTRIBUTION WIDTH: 11.5 % (ref 11.5–14.5)
RED CELL DISTRIBUTION WIDTH: 11.6 % (ref 11.5–14.5)
SODIUM: 140 MMOL/L (ref 135–144)
TOTAL PROTEIN: 6 G/DL (ref 6.1–8.1)
TRIGLYCERIDES: 75 MG/DL (ref 0–149)
TROPONIN-I: < 0.012 NG/ML (ref 0–0.12)
WHITE BLOOD COUNT: 3 10^3/UL (ref 4.8–10.8)
WHITE BLOOD COUNT: 3 10^3/UL (ref 4.8–10.8)
WHITE BLOOD COUNT: 3.1 10^3/UL (ref 4.8–10.8)

## 2018-11-08 RX ADMIN — PANTOPRAZOLE SODIUM 1 MG: 40 TABLET, DELAYED RELEASE ORAL at 05:49

## 2018-11-08 RX ADMIN — AMITRIPTYLINE HYDROCHLORIDE 1 MG: 25 TABLET, FILM COATED ORAL at 22:31

## 2018-11-08 RX ADMIN — PANTOPRAZOLE SODIUM 1 MG: 40 TABLET, DELAYED RELEASE ORAL at 17:21

## 2018-11-08 RX ADMIN — HEPARIN SODIUM 1 UNIT: 5000 INJECTION, SOLUTION INTRAVENOUS; SUBCUTANEOUS at 22:21

## 2018-11-08 RX ADMIN — RANITIDINE 1 MG: 150 TABLET ORAL at 21:45

## 2018-11-08 RX ADMIN — RANITIDINE 1 MG: 150 TABLET ORAL at 09:03

## 2018-11-08 RX ADMIN — MAGNESIUM HYDROXIDE 1 ML: 400 SUSPENSION ORAL at 21:45

## 2018-11-08 RX ADMIN — HEPARIN SODIUM 1 UNIT: 5000 INJECTION, SOLUTION INTRAVENOUS; SUBCUTANEOUS at 09:40

## 2018-11-08 RX ADMIN — LISINOPRIL 1 MG: 10 TABLET ORAL at 08:43

## 2018-11-09 LAB
ALANINE AMINOTRANSFERASE: 27 IU/L (ref 13–69)
ALBUMIN/GLOBULIN RATIO: 1.45
ALBUMIN: 3.5 G/DL (ref 3.3–4.9)
ALKALINE PHOSPHATASE: 86 IU/L (ref 42–121)
ANION GAP: 9 (ref 5–13)
ASPARTATE AMINO TRANSFERASE: 24 IU/L (ref 15–46)
BILIRUBIN,DIRECT: 0 MG/DL (ref 0–0.2)
BILIRUBIN,TOTAL: 0.6 MG/DL (ref 0.2–1.3)
BLOOD UREA NITROGEN: 16 MG/DL (ref 7–20)
CALCIUM: 8.6 MG/DL (ref 8.4–10.2)
CARBON DIOXIDE: 28 MMOL/L (ref 21–31)
CHLORIDE: 102 MMOL/L (ref 97–110)
CREATININE: 0.74 MG/DL (ref 0.44–1)
GLOBULIN: 2.4 G/DL (ref 1.3–3.2)
GLUCOSE: 100 MG/DL (ref 70–220)
MAGNESIUM: 2.3 MG/DL (ref 1.7–2.5)
PHOSPHORUS: 3.9 MG/DL (ref 2.5–4.9)
POTASSIUM: 4.1 MMOL/L (ref 3.5–5.1)
SODIUM: 139 MMOL/L (ref 135–144)
TOTAL PROTEIN: 5.9 G/DL (ref 6.1–8.1)
TROPONIN-I: < 0.012 NG/ML (ref 0–0.12)

## 2018-11-09 RX ADMIN — LISINOPRIL 1 MG: 5 TABLET ORAL at 08:42

## 2018-11-09 RX ADMIN — RANITIDINE 1 MG: 150 TABLET ORAL at 08:41

## 2018-11-09 RX ADMIN — HEPARIN SODIUM 1 UNIT: 5000 INJECTION, SOLUTION INTRAVENOUS; SUBCUTANEOUS at 08:58

## 2018-11-09 RX ADMIN — POLYETHYLENE GLYCOL 3350 1 GM: 17 POWDER, FOR SOLUTION ORAL at 08:42

## 2018-11-09 RX ADMIN — PANTOPRAZOLE SODIUM 1 MG: 40 TABLET, DELAYED RELEASE ORAL at 06:40

## 2018-11-09 RX ADMIN — RANITIDINE 1 MG: 150 TABLET ORAL at 21:15

## 2018-11-09 RX ADMIN — AMITRIPTYLINE HYDROCHLORIDE 1 MG: 25 TABLET, FILM COATED ORAL at 21:00

## 2018-11-10 LAB
ADD MAN DIFF?: NO
ALANINE AMINOTRANSFERASE: 29 IU/L (ref 13–69)
ALBUMIN/GLOBULIN RATIO: 1.27
ALBUMIN: 3.7 G/DL (ref 3.3–4.9)
ALDOSTERONE: 7 NG/DL
ALKALINE PHOSPHATASE: 90 IU/L (ref 42–121)
ANION GAP: 6 (ref 5–13)
ASPARTATE AMINO TRANSFERASE: 36 IU/L (ref 15–46)
BASOPHIL #: 0 10^3/UL (ref 0–0.1)
BASOPHILS %: 0.6 % (ref 0–2)
BILIRUBIN,DIRECT: 0 MG/DL (ref 0–0.2)
BILIRUBIN,TOTAL: 0.4 MG/DL (ref 0.2–1.3)
BLOOD UREA NITROGEN: 18 MG/DL (ref 7–20)
CALCIUM: 8.8 MG/DL (ref 8.4–10.2)
CARBON DIOXIDE: 28 MMOL/L (ref 21–31)
CHLORIDE: 104 MMOL/L (ref 97–110)
CREATININE: 0.74 MG/DL (ref 0.44–1)
EOSINOPHILS #: 0 10^3/UL (ref 0–0.5)
EOSINOPHILS %: 0.9 % (ref 0–7)
GLOBULIN: 2.9 G/DL (ref 1.3–3.2)
GLUCOSE: 100 MG/DL (ref 70–220)
HEMATOCRIT: 37.1 % (ref 37–47)
HEMOGLOBIN: 12.2 G/DL (ref 12–16)
IMMATURE GRANS #M: 0.01 10^3/UL (ref 0–0.03)
IMMATURE GRANS % (M): 0.3 % (ref 0–0.43)
LYMPHOCYTES #: 1.5 10^3/UL (ref 0.8–2.9)
LYMPHOCYTES %: 42.6 % (ref 15–51)
MEAN CORPUSCULAR HEMOGLOBIN: 32.8 PG (ref 29–33)
MEAN CORPUSCULAR HGB CONC: 32.9 G/DL (ref 32–37)
MEAN CORPUSCULAR VOLUME: 99.7 FL (ref 82–101)
MEAN PLATELET VOLUME: 10.1 FL (ref 7.4–10.4)
MONOCYTE #: 0.3 10^3/UL (ref 0.3–0.9)
MONOCYTES %: 8.7 % (ref 0–11)
NEUTROPHIL #: 1.6 10^3/UL (ref 1.6–7.5)
NEUTROPHILS %: 46.9 % (ref 39–77)
NUCLEATED RED BLOOD CELLS #: 0 10^3/UL (ref 0–0)
NUCLEATED RED BLOOD CELLS%: 0 /100WBC (ref 0–0)
PLATELET COUNT: 182 10^3/UL (ref 140–415)
POTASSIUM: 4.6 MMOL/L (ref 3.5–5.1)
RED BLOOD COUNT: 3.72 10^6/UL (ref 4.2–5.4)
RED CELL DISTRIBUTION WIDTH: 11.6 % (ref 11.5–14.5)
SODIUM: 138 MMOL/L (ref 135–144)
TOTAL PROTEIN: 6.6 G/DL (ref 6.1–8.1)
WHITE BLOOD COUNT: 3.5 10^3/UL (ref 4.8–10.8)

## 2018-11-10 RX ADMIN — SODIUM CHLORIDE 1 ML: 9 INJECTION, SOLUTION INTRAMUSCULAR; INTRAVENOUS; SUBCUTANEOUS at 15:32

## 2018-11-10 RX ADMIN — IOHEXOL 1 ML/S: 350 INJECTION, SOLUTION INTRAVENOUS at 15:32

## 2018-11-10 RX ADMIN — ALUMINUM HYDROXIDE, MAGNESIUM HYDROXIDE, AND SIMETHICONE 1 ML: 200; 200; 20 SUSPENSION ORAL at 10:20

## 2018-11-10 RX ADMIN — RANITIDINE 1 MG: 150 TABLET ORAL at 08:39

## 2018-11-10 RX ADMIN — PANTOPRAZOLE SODIUM 1 MG: 40 TABLET, DELAYED RELEASE ORAL at 06:52

## 2018-11-10 RX ADMIN — RANITIDINE 1 MG: 150 TABLET ORAL at 21:38

## 2018-11-10 RX ADMIN — AMLODIPINE BESYLATE 1 MG: 2.5 TABLET ORAL at 08:40

## 2018-11-10 RX ADMIN — AMITRIPTYLINE HYDROCHLORIDE 1 MG: 25 TABLET, FILM COATED ORAL at 21:38

## 2018-11-10 RX ADMIN — VASOPRESSIN 1 ML/S: 20 INJECTION, SOLUTION INTRAMUSCULAR; SUBCUTANEOUS at 15:32

## 2018-11-10 RX ADMIN — POLYETHYLENE GLYCOL 3350 1 GM: 17 POWDER, FOR SOLUTION ORAL at 08:40

## 2018-11-10 RX ADMIN — BARIUM SULFATE 1 ML: 21 SUSPENSION ORAL at 14:44

## 2018-11-11 LAB
ADD MAN DIFF?: NO
ALANINE AMINOTRANSFERASE: 62 IU/L (ref 13–69)
ALBUMIN/GLOBULIN RATIO: 1.52
ALBUMIN: 3.8 G/DL (ref 3.3–4.9)
ALKALINE PHOSPHATASE: 91 IU/L (ref 42–121)
ANION GAP: 9 (ref 5–13)
ASPARTATE AMINO TRANSFERASE: 91 IU/L (ref 15–46)
BASOPHIL #: 0 10^3/UL (ref 0–0.1)
BASOPHILS %: 0.6 % (ref 0–2)
BILIRUBIN,DIRECT: 0 MG/DL (ref 0–0.2)
BILIRUBIN,TOTAL: 0.3 MG/DL (ref 0.2–1.3)
BLOOD UREA NITROGEN: 16 MG/DL (ref 7–20)
CALCIUM: 8.7 MG/DL (ref 8.4–10.2)
CARBON DIOXIDE: 26 MMOL/L (ref 21–31)
CHLORIDE: 99 MMOL/L (ref 97–110)
CREATININE: 0.69 MG/DL (ref 0.44–1)
EOSINOPHILS #: 0 10^3/UL (ref 0–0.5)
EOSINOPHILS %: 0.9 % (ref 0–7)
GLOBULIN: 2.5 G/DL (ref 1.3–3.2)
GLUCOSE: 105 MG/DL (ref 70–220)
HEMATOCRIT: 36.7 % (ref 37–47)
HEMOGLOBIN: 12.2 G/DL (ref 12–16)
IMMATURE GRANS #M: 0.01 10^3/UL (ref 0–0.03)
IMMATURE GRANS % (M): 0.3 % (ref 0–0.43)
LYMPHOCYTES #: 1.5 10^3/UL (ref 0.8–2.9)
LYMPHOCYTES %: 46.2 % (ref 15–51)
MEAN CORPUSCULAR HEMOGLOBIN: 32.6 PG (ref 29–33)
MEAN CORPUSCULAR HGB CONC: 33.2 G/DL (ref 32–37)
MEAN CORPUSCULAR VOLUME: 98.1 FL (ref 82–101)
MEAN PLATELET VOLUME: 10.1 FL (ref 7.4–10.4)
MONOCYTE #: 0.3 10^3/UL (ref 0.3–0.9)
MONOCYTES %: 9.1 % (ref 0–11)
NEUTROPHIL #: 1.4 10^3/UL (ref 1.6–7.5)
NEUTROPHILS %: 42.9 % (ref 39–77)
NUCLEATED RED BLOOD CELLS #: 0 10^3/UL (ref 0–0)
NUCLEATED RED BLOOD CELLS%: 0 /100WBC (ref 0–0)
PLATELET COUNT: 188 10^3/UL (ref 140–415)
POTASSIUM: 4.3 MMOL/L (ref 3.5–5.1)
RED BLOOD COUNT: 3.74 10^6/UL (ref 4.2–5.4)
RED CELL DISTRIBUTION WIDTH: 11.3 % (ref 11.5–14.5)
SODIUM: 134 MMOL/L (ref 135–144)
TOTAL PROTEIN: 6.3 G/DL (ref 6.1–8.1)
WHITE BLOOD COUNT: 3.3 10^3/UL (ref 4.8–10.8)

## 2018-11-11 RX ADMIN — RANITIDINE 1 MG: 150 TABLET ORAL at 08:48

## 2018-11-11 RX ADMIN — PANTOPRAZOLE SODIUM 1 MG: 40 TABLET, DELAYED RELEASE ORAL at 05:43

## 2018-11-11 RX ADMIN — AMLODIPINE BESYLATE 1 MG: 2.5 TABLET ORAL at 08:48

## 2018-11-14 LAB — RENIN, PLASMA: 1.3 NG/ML/H (ref 0.25–5.82)
